# Patient Record
Sex: FEMALE | Race: WHITE | NOT HISPANIC OR LATINO | URBAN - METROPOLITAN AREA
[De-identification: names, ages, dates, MRNs, and addresses within clinical notes are randomized per-mention and may not be internally consistent; named-entity substitution may affect disease eponyms.]

---

## 2018-06-11 ENCOUNTER — OFFICE VISIT (OUTPATIENT)
Dept: FAMILY MEDICINE | Facility: CLINIC | Age: 53
End: 2018-06-11
Payer: COMMERCIAL

## 2018-06-11 VITALS — SYSTOLIC BLOOD PRESSURE: 97 MMHG | DIASTOLIC BLOOD PRESSURE: 61 MMHG | HEART RATE: 60 BPM

## 2018-06-11 DIAGNOSIS — L98.8 RHYTIDES: Primary | ICD-10-CM

## 2018-06-11 DIAGNOSIS — L73.9 FOLLICULITIS: ICD-10-CM

## 2018-06-11 PROCEDURE — 99213 OFFICE O/P EST LOW 20 MIN: CPT | Performed by: FAMILY MEDICINE

## 2018-06-11 RX ORDER — CEPHALEXIN 500 MG/1
500 CAPSULE ORAL 3 TIMES DAILY
Qty: 42 CAPSULE | Refills: 0 | Status: SHIPPED | OUTPATIENT
Start: 2018-06-11 | End: 2018-06-25

## 2018-06-11 RX ORDER — TRETINOIN 0.25 MG/G
CREAM TOPICAL
Qty: 45 G | Refills: 3 | Status: SHIPPED | OUTPATIENT
Start: 2018-06-11

## 2018-06-11 RX ORDER — DESVENLAFAXINE 50 MG/1
TABLET, FILM COATED, EXTENDED RELEASE ORAL
Refills: 0 | COMMUNITY
Start: 2018-02-28

## 2018-06-11 RX ORDER — BUPROPION HYDROCHLORIDE 150 MG/1
TABLET ORAL
Refills: 0 | COMMUNITY
Start: 2018-02-28

## 2018-06-11 RX ORDER — FLUTICASONE PROPIONATE 50 MCG
SPRAY, SUSPENSION (ML) NASAL
Refills: 0 | COMMUNITY
Start: 2018-05-30

## 2018-06-11 NOTE — PATIENT INSTRUCTIONS
FUTURE APPOINTMENTS  Follow up as needed.    ORAL ANTIBIOTIC  Take by mouth 1 capsule/tablet of cephalexin 500 mg three time(s) a day for 14 days. Make sure to finish the entire antibiotic regimen.    Continue monitoring the lesion on the nose. Return to clinic if it persists or begins to develop increasing tenderness, size, bleeding, discharge, infection.    Apply tretinoin 0.025% cream nightly at bedtime to areas on the face.

## 2018-06-11 NOTE — MR AVS SNAPSHOT
"              After Visit Summary   6/11/2018    Dinora Liu    MRN: [id number]           Patient Information     Date Of Birth          1965        Visit Information        Provider Department      6/11/2018 2:00 PM [place] [facility] [place]'s Diagnoses     Rhytides    -  1    Folliculitis          Care Instructions    FUTURE APPOINTMENTS  Follow up as needed.    ORAL ANTIBIOTIC  Take by mouth 1 capsule/tablet of cephalexin 500 mg three time(s) a day for 14 days. Make sure to finish the entire antibiotic regimen.    Continue monitoring the lesion on the nose. Return to clinic if it persists or begins to develop increasing tenderness, size, bleeding, discharge, infection.    Apply tretinoin 0.025% cream nightly at bedtime to areas on the face.          Follow-ups after your visit        Who to contact     If you have questions or need follow up information about today's clinic visit or your schedule please contact [facility] directly at [phone].  Normal or non-critical lab and imaging results will be communicated to you by [employer], letter or phone within 4 business days after the clinic has received the results. If you do not hear from us within 7 days, please contact the clinic through [employer] [email or web address] or phone. If you have a critical or abnormal lab result, we will notify you by phone as soon as possible.  Submit refill requests through [employer] or call your pharmacy and they will forward the refill request to us. Please allow 3 business days for your refill to be completed.          Additional Information About Your Visit        [employer] Information     [employer]Sn[employer] lets you send messages to your doctor, view your test results, renew your prescriptions, schedule appointments and more. To sign up, go to [email or web address] . Click on \"Log in\" on the left side of the screen, which will take you to the Welcome page. Then click on \"Sign up Now\" on " the right side of the page.     You will be asked to enter the access code listed below, as well as some personal information. Please follow the directions to create your username and password.     Your access code is: [email or web address]  Expires: 2018  2:27 PM     Your access code will  in 90 days. If you need help or a new code, please call your [facility] clinic or [phone].        Care EveryWhere ID     This is your Care EveryWhere ID. This could be used by other organizations to access your [facility] medical records  [id number]        Your Vitals Were     Pulse                   60            Blood Pressure from Last 3 Encounters:   18 97/61    Weight from Last 3 Encounters:   No data found for Wt              Today, you had the following     No orders found for display         Today's Medication Changes          These changes are accurate as of 18  2:27 PM.  If you have any questions, ask your nurse or doctor.               Start taking these medicines.        Dose/Directions    tretinoin 0.025 % cream   Commonly known as:  RETIN-A   Used for:  Rhytides   Started by:  Veena Meneses MD        Use every night   Quantity:  45 g   Refills:  3         These medicines have changed or have updated prescriptions.        Dose/Directions    cephALEXin 500 MG capsule   Commonly known as:  KEFLEX   This may have changed:  See the new instructions.   Used for:  Folliculitis   Changed by:  Veena Meneses MD        Dose:  500 mg   Take 1 capsule (500 mg) by mouth 3 times daily for 14 days   Quantity:  42 capsule   Refills:  0            Where to get your medicines      These medications were [id number] [place] [facility] [place] [facility] [address]  [address] [place] [address]     Phone:  [phone]     cephALEXin 500 MG capsule    tretinoin 0.025 % cream                Primary Care Provider Fax #    Provider Not In System [phone]                Equal  Access to Services     [facility]: Hadii aad ku hadeagleadrian Naty, waalvinoda luqadaha, qaybta kaesthelagilberto martinez. So [facility] [phone].    ATENCIÓN: Si ivisla tommie, tiene a josé disposición servicios gratuitos de asistencia lingüística. Llame al [phone].    We comply with applicable federal civil rights laws and [place] laws. We do not discriminate on the basis of race, color, national origin, age, disability, sex, sexual orientation, or gender identity.            Thank you!     Thank you for choosing [facility] [place]  for your care. Our goal is always to provide you with excellent care. Hearing back from our patients is one way we can continue to improve our services. Please take a few minutes to complete the written survey that you may receive in the mail after your visit with us. Thank you!             Your Updated Medication List - Protect others around you: Learn how to safely use, store and throw away your medicines at www.disposemymeds.org.          This list is accurate as of 6/11/18  2:27 PM.  Always use your most recent med list.                   Brand Name Dispense Instructions for use Diagnosis    buPROPion 150 MG 24 hr tablet    WELLBUTRIN XL     TAKE 1 TAB BY MOUTH DAILY.        cephALEXin 500 MG capsule    KEFLEX    42 capsule    Take 1 capsule (500 mg) by mouth 3 times daily for 14 days    Folliculitis       desvenlafaxine succinate 50 MG 24 hr tablet    PRISTIQ     TAKE 1 TAB BY MOUTH DAILY.        fluticasone 50 MCG/ACT spray    FLONASE     SPRAY 2 SPRAYS INTO EACH NOSTRIL EVERY DAY        hydroquinone 4 % Crea     30 g    Apply to face q hs    Melasma       tretinoin 0.025 % cream    RETIN-A    45 g    Use every night    Rhytides

## 2018-06-11 NOTE — PROGRESS NOTES
[facility] - PRIMARY CARE SKIN    CC : folliculitis  SUBJECTIVE:                                                    Dinora Liu is a 50 year old female who presents to clinic today for follow-up of scalp folliculitis, recurring over the past couple of months. Bumps have not been healing on the side of the scalp. The bumps are very itchy. She is concerned that after initial itchiness, the bumps do begin to resolve but take a long time to fully resolve.    Recall that previous recurrent episodes of scalp folliculitis have cleared with oral antibiotic. Previous antibiotics include cephalexin for folliculitis and doxycycline for perioral dermatitis.    She denies using any new shampoos.     Allergies   Allergen Reactions     Doxycycline      Pt states burning and tingling sensation of both hands and feet      Issue Two : She has noted a red spot on the right side of the nose.    Issue Three : She requests a prescription for tretinoin. She has used retinol products before, and she has had some dryness with increased strength.    Uses sunscreen : YES.  Previous history of significant sun exposure:  blistering sunburns : YES    Personal history of skin cancer : NO.  Family history:  Eczema : NO, psoriasis : NO    Occupation : indoor.    There is no problem list on file for this patient.      History reviewed. No pertinent past medical history. History reviewed. No pertinent surgical history.   Social History   Substance Use Topics     Smoking status: Never Smoker     Smokeless tobacco: Never Used     Alcohol use Yes         Prescription Medications as of 6/11/2018             buPROPion (WELLBUTRIN XL) 150 MG 24 hr tablet TAKE 1 TAB BY MOUTH DAILY.    desvenlafaxine succinate (PRISTIQ) 50 MG 24 hr tablet TAKE 1 TAB BY MOUTH DAILY.    fluticasone (FLONASE) 50 MCG/ACT spray SPRAY 2 SPRAYS INTO EACH NOSTRIL EVERY DAY    cephALEXin (KEFLEX) 500 MG capsule TAKE 1 CAPSULE BY MOUTH 2 TIMES DAILY FOR 14 DAYS    hydroquinone  4 % CREA Apply to face q hs            Allergies   Allergen Reactions     Doxycycline      Pt states burning and tingling sensation of both hands and feet         ROS : 14 point review of systems was negative except the symptoms listed above in the HPI.    This document serves as a record of the services and decisions personally performed and made by Supriya Meneses MD. It was created on her behalf by Devyn Ellis, a trained medical scribe.  The creation of this document is based on the scribe's personal observations and the provider's statements to the medical scribe.  Devyn Ellis, June 11, 2018 2:05 PM      OBJECTIVE:                                                    GENERAL: healthy, alert and no distress  SKIN: Norris Skin Type - I.  Scalp and Face were examined. The dermatoscope was used to help evaluate pigmented lesions.  Skin Pertinent Findings:  Scalp : Scattered inflammatory papules on the occipital scalp.  Face : clear.          ASSESSMENT:                                                      Encounter Diagnoses   Name Primary?     Folliculitis      Rhytides Yes         PLAN:                                                    Patient Instructions   FUTURE APPOINTMENTS  Follow up as needed.    ORAL ANTIBIOTIC  Take by mouth 1 capsule/tablet of cephalexin 500 mg three time(s) a day for 14 days. Make sure to finish the entire antibiotic regimen.    Continue monitoring the lesion on the nose. Return to clinic if it persists or begins to develop increasing tenderness, size, bleeding, discharge, infection.    Apply tretinoin 0.025% cream nightly at bedtime to areas on the face.        PROCEDURES:                                                    None.    TT : 20 minutes.  CT : 15 minutes.      The information in this document, created by the medical scribe for me, accurately reflects the services I personally performed and the decisions made by me. I have reviewed and approved this document for accuracy prior  to leaving the patient care area.  Supriya Meneses MD June 11, 2018 2:05 PM  [facility] - PRIMARY CARE SKIN

## 2018-06-11 NOTE — LETTER
6/11/2018         RE: Dinora Liu  [address]  [place] [address]        Dear Colleague,    Thank you for referring your patient, Dinora Liu, to the [facility] [place]. Please see a copy of my visit note below.    [facility] - PRIMARY CARE SKIN    CC : folliculitis  SUBJECTIVE:                                                    Dinora Liu is a 50 year old female who presents to clinic today for follow-up of scalp folliculitis, recurring over the past couple of months. Bumps have not been healing on the side of the scalp. The bumps are very itchy. She is concerned that after initial itchiness, the bumps do begin to resolve but take a long time to fully resolve.    Recall that previous recurrent episodes of scalp folliculitis have cleared with oral antibiotic. Previous antibiotics include cephalexin for folliculitis and doxycycline for perioral dermatitis.    She denies using any new shampoos.     Allergies   Allergen Reactions     Doxycycline      Pt states burning and tingling sensation of both hands and feet      Issue Two : She has noted a red spot on the right side of the nose.    Issue Three : She requests a prescription for tretinoin. She has used retinol products before, and she has had some dryness with increased strength.    Uses sunscreen : YES.  Previous history of significant sun exposure:  blistering sunburns : YES    Personal history of skin cancer : NO.  Family history:  Eczema : NO, psoriasis : NO    Occupation : indoor.    There is no problem list on file for this patient.      History reviewed. No pertinent past medical history. History reviewed. No pertinent surgical history.   Social History   Substance Use Topics     Smoking status: Never Smoker     Smokeless tobacco: Never Used     Alcohol use Yes         Prescription Medications as of 6/11/2018             buPROPion (WELLBUTRIN XL) 150 MG 24 hr tablet TAKE 1 TAB BY MOUTH DAILY.    desvenlafaxine succinate  (PRISTIQ) 50 MG 24 hr tablet TAKE 1 TAB BY MOUTH DAILY.    fluticasone (FLONASE) 50 MCG/ACT spray SPRAY 2 SPRAYS INTO EACH NOSTRIL EVERY DAY    cephALEXin (KEFLEX) 500 MG capsule TAKE 1 CAPSULE BY MOUTH 2 TIMES DAILY FOR 14 DAYS    hydroquinone 4 % CREA Apply to face q hs            Allergies   Allergen Reactions     Doxycycline      Pt states burning and tingling sensation of both hands and feet         ROS : 14 point review of systems was negative except the symptoms listed above in the HPI.    This document serves as a record of the services and decisions personally performed and made by Supriya Meneses MD. It was created on her behalf by Devyn Ellis, a trained medical scribe.  The creation of this document is based on the scribe's personal observations and the provider's statements to the medical scribe.  Devyn Ellis, June 11, 2018 2:05 PM      OBJECTIVE:                                                    GENERAL: healthy, alert and no distress  SKIN: Norris Skin Type - I.  Scalp and Face were examined. The dermatoscope was used to help evaluate pigmented lesions.  Skin Pertinent Findings:  Scalp : Scattered inflammatory papules on the occipital scalp.  Face : clear.          ASSESSMENT:                                                      Encounter Diagnoses   Name Primary?     Folliculitis      Rhytides Yes         PLAN:                                                    Patient Instructions   FUTURE APPOINTMENTS  Follow up as needed.    ORAL ANTIBIOTIC  Take by mouth 1 capsule/tablet of cephalexin 500 mg three time(s) a day for 14 days. Make sure to finish the entire antibiotic regimen.    Continue monitoring the lesion on the nose. Return to clinic if it persists or begins to develop increasing tenderness, size, bleeding, discharge, infection.    Apply tretinoin 0.025% cream nightly at bedtime to areas on the face.        PROCEDURES:                                                    None.    TT : 20  minutes.  CT : 15 minutes.      The information in this document, created by the medical scribe for me, accurately reflects the services I personally performed and the decisions made by me. I have reviewed and approved this document for accuracy prior to leaving the patient care area.  Supriya Meneses MD June 11, 2018 2:05 PM  [facility] - PRIMARY CARE SKIN    Again, thank you for allowing me to participate in the care of your patient.        Sincerely,        Veena Meneses MD

## 2018-08-21 ENCOUNTER — HOSPITAL PATHOLOGY (OUTPATIENT)
Dept: OTHER | Facility: CLINIC | Age: 53
End: 2018-08-21

## 2018-08-23 LAB — COPATH REPORT: NORMAL

## 2019-08-27 ENCOUNTER — OFFICE VISIT (OUTPATIENT)
Dept: FAMILY MEDICINE | Facility: CLINIC | Age: 54
End: 2019-08-27
Payer: COMMERCIAL

## 2019-08-27 VITALS — SYSTOLIC BLOOD PRESSURE: 110 MMHG | DIASTOLIC BLOOD PRESSURE: 68 MMHG

## 2019-08-27 DIAGNOSIS — L73.9 FOLLICULITIS: Primary | ICD-10-CM

## 2019-08-27 PROCEDURE — 99213 OFFICE O/P EST LOW 20 MIN: CPT | Performed by: FAMILY MEDICINE

## 2019-08-27 RX ORDER — CEPHALEXIN 500 MG/1
500 CAPSULE ORAL 3 TIMES DAILY
Qty: 42 CAPSULE | Refills: 0 | Status: SHIPPED | OUTPATIENT
Start: 2019-08-27 | End: 2019-09-10

## 2019-08-27 NOTE — PATIENT INSTRUCTIONS
FUTURE APPOINTMENTS  Follow up as needed.    ORAL ANTIBIOTIC  Take by mouth 1 capsule/tablet of cephalexin 500 mg three time(s) a day for 2 weeks. Make sure to complete the entire antibiotic regimen as instructed to prevent development of bacterial resistance to antibiotics.

## 2019-08-27 NOTE — LETTER
8/27/2019         RE: Dinora Liu  [address]  [place] [address]        Dear Colleague,    Thank you for referring your patient, Dinora Liu, to the [facility] [place]. Please see a copy of my visit note below.    [facility] - PRIMARY CARE SKIN    CC: folliculitis  SUBJECTIVE:   Dinora Liu is a(n) 53 year old female who presents to clinic today for follow-up of folliculitis.    This has been a chronic episodic issue that seems to occur in the summer months , but this flare began 2-3 weeks ago. Folliculitis episodes typically start as bumps and itchiness. Itching is noted around the forehead, lips, on the chin, and on the scalp.    She had been managing symptoms with topical clindamycin. Previous therapies for folliculitis include oral antibiotics cephalexin. A previous episode of perioral dermatitis was treated with doxycycline (to which she had side effects). She is also taking Zyrtec. Her shampoo has been used since January.    Personal Medical History  Skin cancer: NO    Family Medical History  Eczema Psoriasis   NO NO     Sun Exposure History  Previous history of significant sun exposure:  Blistering sunburns: YES.  Sunscreen usage: YES.    Occupation: (indoor).    Refer to electronic medical record (EMR) for past medical history and medications.    INTEGUMENTARY/SKIN: POSITIVE for pruritus  ROS: 14 point review of systems was negative except the symptoms listed above in the HPI.    This document serves as a record of the services and decisions personally performed and made by Veena Meneses MD and was created by Devyn Ellis, a trained medical scribe, based on personal observations and provider statements to the medical scribe.  August 27, 2019 1:58 PM   Devyn Ellis    OBJECTIVE:   GENERAL: healthy, alert and no distress.  SKIN: Norris Skin Type - II-III.  Scalp and Face examined. The dermatoscope was used to help evaluate pigmented lesions.  Skin Pertinent  Findings:  Scalp: Scattered inflammatory papules along the frontal scalp hairline and on the occipital scalp.    Face: Few scattered palpable bumps on the mandible, no associated inflammation.    Diagnostic Test Results:  none     ASSESSMENT:     Encounter Diagnosis   Name Primary?     Folliculitis Yes         PLAN:   Patient Instructions   FUTURE APPOINTMENTS  Follow up as needed.    ORAL ANTIBIOTIC  Take by mouth 1 capsule/tablet of cephalexin 500 mg three time(s) a day for 2 weeks. Make sure to complete the entire antibiotic regimen as instructed to prevent development of bacterial resistance to antibiotics.    TT: 20 minutes.  CT: 15 minutes.    The information in this document, created by the medical scribe for me, accurately reflects the services I personally performed and the decisions made by me. I have reviewed and approved this document for accuracy prior to leaving the patient care area.  August 27, 2019 1:58 PM  Veena Meneses MD  [facility]    Again, thank you for allowing me to participate in the care of your patient.        Sincerely,        Veena Meneses MD

## 2019-08-27 NOTE — PROGRESS NOTES
[facility] - PRIMARY CARE SKIN    CC: folliculitis  SUBJECTIVE:   Dinora Liu is a(n) 53 year old female who presents to clinic today for follow-up of folliculitis.    This has been a chronic episodic issue that seems to occur in the summer months , but this flare began 2-3 weeks ago. Folliculitis episodes typically start as bumps and itchiness. Itching is noted around the forehead, lips, on the chin, and on the scalp.    She had been managing symptoms with topical clindamycin. Previous therapies for folliculitis include oral antibiotics cephalexin. A previous episode of perioral dermatitis was treated with doxycycline (to which she had side effects). She is also taking Zyrtec. Her shampoo has been used since January.    Personal Medical History  Skin cancer: NO    Family Medical History  Eczema Psoriasis   NO NO     Sun Exposure History  Previous history of significant sun exposure:  Blistering sunburns: YES.  Sunscreen usage: YES.    Occupation: (indoor).    Refer to electronic medical record (EMR) for past medical history and medications.    INTEGUMENTARY/SKIN: POSITIVE for pruritus  ROS: 14 point review of systems was negative except the symptoms listed above in the HPI.    This document serves as a record of the services and decisions personally performed and made by Veena Meneses MD and was created by Devyn Ellis, a trained medical scribe, based on personal observations and provider statements to the medical scribe.  August 27, 2019 1:58 PM   Devyn Ellis    OBJECTIVE:   GENERAL: healthy, alert and no distress.  SKIN: Norris Skin Type - II-III.  Scalp and Face examined. The dermatoscope was used to help evaluate pigmented lesions.  Skin Pertinent Findings:  Scalp: Scattered inflammatory papules along the frontal scalp hairline and on the occipital scalp.    Face: Few scattered palpable bumps on the mandible, no associated inflammation.    Diagnostic Test Results:  none     ASSESSMENT:      Encounter Diagnosis   Name Primary?     Folliculitis Yes         PLAN:   Patient Instructions   FUTURE APPOINTMENTS  Follow up as needed.    ORAL ANTIBIOTIC  Take by mouth 1 capsule/tablet of cephalexin 500 mg three time(s) a day for 2 weeks. Make sure to complete the entire antibiotic regimen as instructed to prevent development of bacterial resistance to antibiotics.    TT: 20 minutes.  CT: 15 minutes.    The information in this document, created by the medical scribe for me, accurately reflects the services I personally performed and the decisions made by me. I have reviewed and approved this document for accuracy prior to leaving the patient care area.  August 27, 2019 1:58 PM  Veena Meneses MD  [facility] [place]

## 2021-04-24 ENCOUNTER — HEALTH MAINTENANCE LETTER (OUTPATIENT)
Age: 56
End: 2021-04-24

## 2021-10-03 ENCOUNTER — HEALTH MAINTENANCE LETTER (OUTPATIENT)
Age: 56
End: 2021-10-03

## 2022-05-15 ENCOUNTER — HEALTH MAINTENANCE LETTER (OUTPATIENT)
Age: 57
End: 2022-05-15

## 2022-06-21 ENCOUNTER — ANCILLARY PROCEDURE (OUTPATIENT)
Dept: ULTRASOUND IMAGING | Facility: CLINIC | Age: 57
End: 2022-06-21
Attending: PHYSICIAN ASSISTANT
Payer: COMMERCIAL

## 2022-06-21 DIAGNOSIS — R79.89 ELEVATED LIVER FUNCTION TESTS: ICD-10-CM

## 2022-06-21 PROCEDURE — 91200 LIVER ELASTOGRAPHY: CPT | Performed by: STUDENT IN AN ORGANIZED HEALTH CARE EDUCATION/TRAINING PROGRAM

## 2022-09-10 ENCOUNTER — HEALTH MAINTENANCE LETTER (OUTPATIENT)
Age: 57
End: 2022-09-10

## 2023-01-22 ENCOUNTER — HEALTH MAINTENANCE LETTER (OUTPATIENT)
Age: 58
End: 2023-01-22

## 2024-02-18 ENCOUNTER — HEALTH MAINTENANCE LETTER (OUTPATIENT)
Age: 59
End: 2024-02-18

## 2024-07-07 ENCOUNTER — HEALTH MAINTENANCE LETTER (OUTPATIENT)
Age: 59
End: 2024-07-07

## 2025-03-09 ENCOUNTER — HEALTH MAINTENANCE LETTER (OUTPATIENT)
Age: 60
End: 2025-03-09